# Patient Record
Sex: FEMALE | Race: WHITE | NOT HISPANIC OR LATINO
[De-identification: names, ages, dates, MRNs, and addresses within clinical notes are randomized per-mention and may not be internally consistent; named-entity substitution may affect disease eponyms.]

---

## 2017-10-09 ENCOUNTER — TRANSCRIPTION ENCOUNTER (OUTPATIENT)
Age: 57
End: 2017-10-09

## 2018-05-29 ENCOUNTER — TRANSCRIPTION ENCOUNTER (OUTPATIENT)
Age: 58
End: 2018-05-29

## 2019-01-12 ENCOUNTER — TRANSCRIPTION ENCOUNTER (OUTPATIENT)
Age: 59
End: 2019-01-12

## 2019-02-17 ENCOUNTER — TRANSCRIPTION ENCOUNTER (OUTPATIENT)
Age: 59
End: 2019-02-17

## 2020-08-06 ENCOUNTER — FORM ENCOUNTER (OUTPATIENT)
Age: 60
End: 2020-08-06

## 2021-07-09 ENCOUNTER — EMERGENCY (EMERGENCY)
Facility: HOSPITAL | Age: 61
LOS: 1 days | Discharge: HOME | End: 2021-07-09
Attending: EMERGENCY MEDICINE | Admitting: EMERGENCY MEDICINE
Payer: COMMERCIAL

## 2021-07-09 VITALS
WEIGHT: 160.06 LBS | HEART RATE: 85 BPM | DIASTOLIC BLOOD PRESSURE: 71 MMHG | TEMPERATURE: 98 F | HEIGHT: 64 IN | RESPIRATION RATE: 16 BRPM | OXYGEN SATURATION: 98 % | SYSTOLIC BLOOD PRESSURE: 148 MMHG

## 2021-07-09 VITALS
SYSTOLIC BLOOD PRESSURE: 135 MMHG | HEART RATE: 79 BPM | DIASTOLIC BLOOD PRESSURE: 74 MMHG | OXYGEN SATURATION: 98 % | TEMPERATURE: 98 F | RESPIRATION RATE: 16 BRPM

## 2021-07-09 PROCEDURE — 73590 X-RAY EXAM OF LOWER LEG: CPT

## 2021-07-09 PROCEDURE — 99284 EMERGENCY DEPT VISIT MOD MDM: CPT

## 2021-07-09 PROCEDURE — 99284 EMERGENCY DEPT VISIT MOD MDM: CPT | Mod: 25

## 2021-07-09 PROCEDURE — 73564 X-RAY EXAM KNEE 4 OR MORE: CPT

## 2021-07-09 PROCEDURE — 73564 X-RAY EXAM KNEE 4 OR MORE: CPT | Mod: 26,LT

## 2021-07-09 PROCEDURE — 73590 X-RAY EXAM OF LOWER LEG: CPT | Mod: 26,LT

## 2021-07-09 RX ORDER — SIMVASTATIN 20 MG/1
1 TABLET, FILM COATED ORAL
Qty: 0 | Refills: 0 | DISCHARGE

## 2021-07-09 RX ORDER — OXYCODONE AND ACETAMINOPHEN 5; 325 MG/1; MG/1
1 TABLET ORAL
Qty: 12 | Refills: 0
Start: 2021-07-09

## 2021-07-09 NOTE — ED PROVIDER NOTE - OBJECTIVE STATEMENT
59 y/o F w/ no pertinent PMHx presents to ED c/o knee pain s/p fall. Pt fell from 2 steps in basement and shoulder broke through wall and knee hit ground x2 hours ago. Pt endorses ankle pain and knee pain. Denies chest pain and abd pain. Pt is COVID vaccinated. Nonsmoker. NKDA

## 2021-07-09 NOTE — ED PROVIDER NOTE - CARE PLAN
Principal Discharge DX:	Knee injuries, left, initial encounter  Secondary Diagnosis:	Fall on stairs, initial encounter

## 2021-07-09 NOTE — ED ADULT NURSE NOTE - CHPI ED NUR TIMING2
Patient called in stating that an order for an MRI was supposed to be scheduled but there is no order in the chart. Please advise   sudden onset

## 2021-07-09 NOTE — ED PROVIDER NOTE - PHYSICAL EXAMINATION
L knee: mild edema, FROM with no laxity. Nl mild tend to lat mid lower leg. No tend to ankle / foot. Nl dist str/sens equal bl, 2+ pulses.

## 2021-07-09 NOTE — ED PROVIDER NOTE - PROGRESS NOTE DETAILS
Discussed with Patient and/or Family regarding the X-ray findings.  Discussed the risks of occult / secondary fracture or ligamentous/tendon injury. Discussed the importance of close prompt follow-up with Orthopaedics for definitive workup and treatment.  Also discussed injury / neuro precautions.  Verbalization of understanding of all instructions was shown and an opportunity was given for questions.   Pt placed in knee imob, immuno UTD

## 2021-07-09 NOTE — ED ADULT NURSE NOTE - OBJECTIVE STATEMENT
pt c/o L knee pain s/p slip and fall down @ step approx.  2 hrs ago. pt states she was wearing flip flops and tripped down the steps landing on her L elbow but now has L knee pain. Pt denies head injury, LOC or Blood thinners. pt MAEx4, neuro intact.

## 2021-07-09 NOTE — ED PROVIDER NOTE - PATIENT PORTAL LINK FT
You can access the FollowMyHealth Patient Portal offered by [facility] by registering at the following website: [email or web address]. By joining [facility]’s FollowMyHealth portal, you will also be able to view your health information using other applications (apps) compatible with our system.

## 2021-07-09 NOTE — ED PROVIDER NOTE - ENMT, MLM
Airway patent, Nasal mucosa clear. Mouth with normal mucosa. Throat has no vesicles, no oropharyngeal exudates and uvula is midline. No signs of head trauma.

## 2021-07-09 NOTE — ED PROVIDER NOTE - CARE PROVIDER_API CALL
FINA PANCHAL  Orthopedic Surgery  [address] [place] [address]  Phone: [phone]  Fax: [phone]  Follow Up Time:     WAGNER STAUFFER  [address] [address]  Phone: [phone]  Fax: [phone]  Follow Up Time:

## 2021-07-09 NOTE — ED ADULT NURSE NOTE - CHPI ED NUR SYMPTOMS NEG
no abrasion/no back pain/no bruising/no difficulty bearing weight/no fever/no numbness/no tingling/no weakness

## 2021-07-09 NOTE — ED PROVIDER NOTE - NSFOLLOWUPINSTRUCTIONS_ED_ALL_ED_FT
1) Follow-up with your Primary Medical Doctor. Call today / next business day for prompt follow-up.  2) Return to Emergency room for any worsening or persistent pain, shortness of breath, chest pains, abdominal pain, numbness, tingling, headaches, vomiting, visual changes, dizziness, weakness, fever, if you are having difficulty getting around or you feel unsteady, or if you have any other concerning symptoms.  3) See attached instruction sheets for additional information, including information regarding signs and symptoms to look out for, reasons to seek immediate care and other important instructions.  4) Make sure to take your time with walking, especially when first standing up.   5) Follow-up with orthopedics , call monday for prompt follow-up  6) percocet as needed , no driving

## 2021-09-05 ENCOUNTER — TRANSCRIPTION ENCOUNTER (OUTPATIENT)
Age: 61
End: 2021-09-05

## 2021-12-02 ENCOUNTER — TRANSCRIPTION ENCOUNTER (OUTPATIENT)
Age: 61
End: 2021-12-02

## 2022-05-03 ENCOUNTER — NON-APPOINTMENT (OUTPATIENT)
Age: 62
End: 2022-05-03

## 2022-05-03 PROBLEM — Z00.00 ENCOUNTER FOR PREVENTIVE HEALTH EXAMINATION: Status: ACTIVE | Noted: 2022-05-03

## 2022-05-17 ENCOUNTER — NON-APPOINTMENT (OUTPATIENT)
Age: 62
End: 2022-05-17

## 2022-05-31 ENCOUNTER — FORM ENCOUNTER (OUTPATIENT)
Age: 62
End: 2022-05-31

## 2022-10-20 ENCOUNTER — NON-APPOINTMENT (OUTPATIENT)
Age: 62
End: 2022-10-20

## 2022-11-03 PROBLEM — Z78.9 OTHER SPECIFIED HEALTH STATUS: Chronic | Status: ACTIVE | Noted: 2021-07-09

## 2023-01-09 ENCOUNTER — APPOINTMENT (OUTPATIENT)
Dept: OBGYN | Facility: CLINIC | Age: 63
End: 2023-01-09
Payer: COMMERCIAL

## 2023-01-09 VITALS
DIASTOLIC BLOOD PRESSURE: 82 MMHG | HEIGHT: 64 IN | HEART RATE: 82 BPM | OXYGEN SATURATION: 97 % | RESPIRATION RATE: 16 BRPM | WEIGHT: 164 LBS | SYSTOLIC BLOOD PRESSURE: 122 MMHG | BODY MASS INDEX: 28 KG/M2

## 2023-01-09 DIAGNOSIS — Z86.79 PERSONAL HISTORY OF OTHER DISEASES OF THE CIRCULATORY SYSTEM: ICD-10-CM

## 2023-01-09 PROCEDURE — 99386 PREV VISIT NEW AGE 40-64: CPT

## 2023-01-09 PROCEDURE — 82270 OCCULT BLOOD FECES: CPT

## 2023-01-09 NOTE — PHYSICAL EXAM
[Chaperone Present] : A chaperone was present in the examining room during all aspects of the physical examination [Appropriately responsive] : appropriately responsive [Alert] : alert [No Acute Distress] : no acute distress [No Lymphadenopathy] : no lymphadenopathy [Regular Rate Rhythm] : regular rate rhythm [No Murmurs] : no murmurs [Clear to Auscultation B/L] : clear to auscultation bilaterally [Soft] : soft [Non-tender] : non-tender [Non-distended] : non-distended [No HSM] : No HSM [No Lesions] : no lesions [No Mass] : no mass [Oriented x3] : oriented x3 [Examination Of The Breasts] : a normal appearance [No Masses] : no breast masses were palpable [Labia Majora] : normal [Labia Minora] : normal [Normal] : normal [Uterine Adnexae] : normal [FreeTextEntry1] : The documentation for this encounter was entered by Flory Fairchild acting as a scribe for Dr. Wahl.  [Occult Blood Positive] : was negative for occult blood analysis

## 2023-01-09 NOTE — PLAN
[FreeTextEntry1] : I have spent 40 minutes of time on this encounter.  Greater than 50% of the face-to-face encounter time was spent on counseling and/or coordination of care for examination findings, differential, testing, management and planning. 10 minutes were allotted to discussing the depression screening. Yearly breast cancer screening with no current clinical or radiographic concerns.  The patient was reminded regarding future well breast and general healthcare, breast cancer risk reduction, the importance of self-examination and the need for follow up.   She was again reminded of the need to take Vit D3 2500  IU daily or to keep a Vit D level above 30, and Tumeric 1000mg daily with Black Pepper.  Plan continued yearly imaging and breast follow up, sooner as needed.  I counseled the patient on current recommendations to reduce breast cancer risk including but not inclusive to regular exercise 20-30 minutes 3-4 times a week, low fat diet, limiting alcohol consumption, maintenance of ideal body weight, yearly imaging and self breast awareness.  Questions answered.  I encouraged in light of Covid 19, social distancing, frequent hand washing and precautions to stay healthy.\par  1)  Self breast exam instructions, calcium supplementation discussed with the patient.\par 2)  Mammography, lipid profile assessment, TSH screening, fasting glucose testing, colonoscopy screening were discussed with the patient.  Vitamin D supplementation\par 3)  Maintain healthy weight.\par 4)  Regular health maintenance with PCP.\par 5)  Remain tobacco free.\par 6)  Limit alcohol intake to less than 5 drinks per week.\par 7)  Osteoporosis screening.\par 8)  Annual cholesterol screening.\par 9)  Annual influenza vaccine.The importance of routine physical activity was reviewed and a goal of 150 minutes of moderately vigorous exercise per week was endorsed.

## 2023-01-09 NOTE — HISTORY OF PRESENT ILLNESS
[Y] : Positive pregnancy history [LMP unknown] : LMP unknown [Hot Flashes] : hot flashes [Night Sweats] : night sweats [unknown] : Patient is unsure of the date of her LMP [FreeTextEntry1] : pt is here for annual. She is doing well. \par The patient presents today for a routine GYN exam.  She offers no complaints.  We reviewed together in detail her past medical and surgical histories, allergies and medication usage, social and family history.   All questions were answered in easy to understand language.  [Mammogramdate] : 08/21 [BreastSonogramDate] : 08/21 [BoneDensityDate] : 03/22 [PGHxTotal] : 2

## 2023-01-11 LAB
HPV 16 E6+E7 MRNA CVX QL NAA+PROBE: NOT DETECTED
HPV18+45 E6+E7 MRNA CVX QL NAA+PROBE: NOT DETECTED

## 2023-01-14 LAB — CYTOLOGY CVX/VAG DOC THIN PREP: NORMAL

## 2023-12-25 ENCOUNTER — NON-APPOINTMENT (OUTPATIENT)
Age: 63
End: 2023-12-25

## 2024-01-04 NOTE — ED ADULT TRIAGE NOTE - WEIGHT IN KG
Cooperative Cooperative 72.6 Cooperative Cooperative Cooperative Cooperative Cooperative Cooperative Cooperative Cooperative Cooperative Cooperative Cooperative Cooperative Cooperative Cooperative Cooperative Cooperative Cooperative

## 2024-03-05 DIAGNOSIS — Z13.820 ENCOUNTER FOR SCREENING FOR OSTEOPOROSIS: ICD-10-CM

## 2024-03-05 DIAGNOSIS — Z11.3 ENCOUNTER FOR SCREENING FOR INFECTIONS WITH A PREDOMINANTLY SEXUAL MODE OF TRANSMISSION: ICD-10-CM

## 2024-03-05 DIAGNOSIS — Z12.39 ENCOUNTER FOR OTHER SCREENING FOR MALIGNANT NEOPLASM OF BREAST: ICD-10-CM

## 2024-03-06 ENCOUNTER — APPOINTMENT (OUTPATIENT)
Dept: OBGYN | Facility: CLINIC | Age: 64
End: 2024-03-06
Payer: COMMERCIAL

## 2024-03-06 VITALS
RESPIRATION RATE: 16 BRPM | WEIGHT: 163 LBS | BODY MASS INDEX: 27.83 KG/M2 | SYSTOLIC BLOOD PRESSURE: 120 MMHG | HEART RATE: 73 BPM | HEIGHT: 64 IN | OXYGEN SATURATION: 99 % | DIASTOLIC BLOOD PRESSURE: 70 MMHG

## 2024-03-06 DIAGNOSIS — Z12.4 ENCOUNTER FOR SCREENING FOR MALIGNANT NEOPLASM OF CERVIX: ICD-10-CM

## 2024-03-06 DIAGNOSIS — Z12.11 ENCOUNTER FOR SCREENING FOR MALIGNANT NEOPLASM OF COLON: ICD-10-CM

## 2024-03-06 DIAGNOSIS — Z12.39 ENCOUNTER FOR OTHER SCREENING FOR MALIGNANT NEOPLASM OF BREAST: ICD-10-CM

## 2024-03-06 DIAGNOSIS — Z01.419 ENCOUNTER FOR GYNECOLOGICAL EXAMINATION (GENERAL) (ROUTINE) W/OUT ABNORMAL FINDINGS: ICD-10-CM

## 2024-03-06 DIAGNOSIS — Z78.9 OTHER SPECIFIED HEALTH STATUS: ICD-10-CM

## 2024-03-06 DIAGNOSIS — Z13.31 ENCOUNTER FOR SCREENING FOR DEPRESSION: ICD-10-CM

## 2024-03-06 LAB
BILIRUB UR QL STRIP: NEGATIVE
CLARITY UR: CLEAR
COLLECTION METHOD: NORMAL
GLUCOSE UR-MCNC: NEGATIVE
HCG UR QL: 0.2 EU/DL
HGB UR QL STRIP.AUTO: NORMAL
KETONES UR-MCNC: NEGATIVE
LEUKOCYTE ESTERASE UR QL STRIP: NORMAL
NITRITE UR QL STRIP: NEGATIVE
PH UR STRIP: 5.5
PROT UR STRIP-MCNC: NEGATIVE
SP GR UR STRIP: 1.02

## 2024-03-06 PROCEDURE — 81003 URINALYSIS AUTO W/O SCOPE: CPT | Mod: QW

## 2024-03-06 PROCEDURE — 82270 OCCULT BLOOD FECES: CPT

## 2024-03-06 PROCEDURE — 99396 PREV VISIT EST AGE 40-64: CPT

## 2024-03-06 RX ORDER — ZOLPIDEM TARTRATE 10 MG/1
10 TABLET, FILM COATED ORAL
Refills: 0 | Status: ACTIVE | COMMUNITY

## 2024-03-06 RX ORDER — LOSARTAN POTASSIUM 50 MG/1
50 TABLET, FILM COATED ORAL
Refills: 0 | Status: ACTIVE | COMMUNITY

## 2024-03-06 RX ORDER — MELATONIN 3 MG
TABLET ORAL
Refills: 0 | Status: ACTIVE | COMMUNITY

## 2024-03-06 RX ORDER — SIMVASTATIN 40 MG/1
40 TABLET, FILM COATED ORAL
Refills: 0 | Status: ACTIVE | COMMUNITY

## 2024-03-06 NOTE — HISTORY OF PRESENT ILLNESS
[Y] : Positive pregnancy history [Night Sweats] : night sweats [Hot Flashes] : hot flashes [Insomnia] : insomnia [Men] : men [Currently Active] : currently active [No] : No [TextBox_4] : The patient presents today for a routine GYN exam. She offers no complaints. We reviewed together in detail her past medical and surgical histories, allergies and medication usage, social and family history. All questions were answered in easy to understand language. [Mammogramdate] : 02/2023 [BreastSonogramDate] : 02/2023 [PapSmeardate] : 01/2023 [BoneDensityDate] : 03/2022 [ColonoscopyDate] : 01/2022 [TextBox_78] :  no hv [[facility]xFullTerm] : 2 [PGHxTotal] : 2 [[facility] Center[facility]Living] : 2

## 2024-03-06 NOTE — PHYSICAL EXAM
[Chaperone Present] : A chaperone was present in the examining room during all aspects of the physical examination [Appropriately responsive] : appropriately responsive [Alert] : alert [No Lymphadenopathy] : no lymphadenopathy [No Acute Distress] : no acute distress [Non-tender] : non-tender [Soft] : soft [Non-distended] : non-distended [No HSM] : No HSM [No Lesions] : no lesions [Oriented x3] : oriented x3 [No Mass] : no mass [Examination Of The Breasts] : a normal appearance [Diffuse Fibrous Tissue In The Right Breast] : fibrocystic changes [No Masses] : no breast masses were palpable [Labia Majora] : normal [Labia Minora] : normal [Uterine Adnexae] : normal [Normal rectal exam] : was normal [Normal Brown Stool] : was normal and brown [Normal] : was normal [None] : there was no rectal mass  [FreeTextEntry1] : This note was written by Dora Gross on 03/06/2024, acting as a scribe for Dr. Clemente Wahl MD. All medic record entries were at my, Dr. Clemente Wahl MD, direction and personally dictated by me in 03/06/2024. I have personally reviewed the chart and agree that the record accurately reflects my personal performance of the history, physical exam, assessment, and plan.  [FreeTextEntry6] : mobile cyst 9 o'clock R breast [Occult Blood Positive] : was negative for occult blood analysis [Internal Hemorrhoid] : no internal hemorrhoids were present [External Hemorrhoid] : no external hemorrhoids were present [Skin Tags] : no residual hemorrhoidal skin tags

## 2024-03-07 LAB
HPV 16 E6+E7 MRNA CVX QL NAA+PROBE: NOT DETECTED
HPV18+45 E6+E7 MRNA CVX QL NAA+PROBE: NOT DETECTED
SOURCE AMPLIFICATION: NORMAL
T VAGINALIS RRNA SPEC QL NAA+PROBE: NOT DETECTED

## 2024-03-11 LAB — CYTOLOGY CVX/VAG DOC THIN PREP: NORMAL

## 2024-05-04 ENCOUNTER — NON-APPOINTMENT (OUTPATIENT)
Age: 64
End: 2024-05-04

## 2024-05-31 NOTE — ED ADULT NURSE NOTE - HOW OFTEN DO YOU HAVE A DRINK CONTAINING ALCOHOL?
Lipid abnormalities are improving with treatment    Plan:  Continue same medication/s without change.      Discussed medication dosage, use, side effects, and goals of treatment in detail.    Counseled patient on lifestyle modifications to help control hyperlipidemia.     Patient Treatment Goals:   LDL goal is under 100    Followup at the next regular appointment.   Never

## 2024-09-26 ENCOUNTER — NON-APPOINTMENT (OUTPATIENT)
Age: 64
End: 2024-09-26

## 2024-12-26 ENCOUNTER — NON-APPOINTMENT (OUTPATIENT)
Age: 64
End: 2024-12-26

## 2025-02-02 ENCOUNTER — NON-APPOINTMENT (OUTPATIENT)
Age: 65
End: 2025-02-02

## 2025-03-14 DIAGNOSIS — Z13.820 ENCOUNTER FOR SCREENING FOR OSTEOPOROSIS: ICD-10-CM

## 2025-03-14 DIAGNOSIS — Z13.31 ENCOUNTER FOR SCREENING FOR DEPRESSION: ICD-10-CM

## 2025-03-14 DIAGNOSIS — Z12.39 ENCOUNTER FOR OTHER SCREENING FOR MALIGNANT NEOPLASM OF BREAST: ICD-10-CM

## 2025-03-19 ENCOUNTER — APPOINTMENT (OUTPATIENT)
Dept: OBGYN | Facility: CLINIC | Age: 65
End: 2025-03-19
Payer: COMMERCIAL

## 2025-03-19 VITALS
HEIGHT: 64 IN | SYSTOLIC BLOOD PRESSURE: 116 MMHG | WEIGHT: 158 LBS | DIASTOLIC BLOOD PRESSURE: 72 MMHG | OXYGEN SATURATION: 98 % | BODY MASS INDEX: 26.98 KG/M2 | HEART RATE: 85 BPM | RESPIRATION RATE: 14 BRPM

## 2025-03-19 DIAGNOSIS — Z80.1 FAMILY HISTORY OF MALIGNANT NEOPLASM OF TRACHEA, BRONCHUS AND LUNG: ICD-10-CM

## 2025-03-19 DIAGNOSIS — Z01.419 ENCOUNTER FOR GYNECOLOGICAL EXAMINATION (GENERAL) (ROUTINE) W/OUT ABNORMAL FINDINGS: ICD-10-CM

## 2025-03-19 DIAGNOSIS — Z12.39 ENCOUNTER FOR OTHER SCREENING FOR MALIGNANT NEOPLASM OF BREAST: ICD-10-CM

## 2025-03-19 DIAGNOSIS — Z82.49 FAMILY HISTORY OF ISCHEMIC HEART DISEASE AND OTHER DISEASES OF THE CIRCULATORY SYSTEM: ICD-10-CM

## 2025-03-19 DIAGNOSIS — Z12.11 ENCOUNTER FOR SCREENING FOR MALIGNANT NEOPLASM OF COLON: ICD-10-CM

## 2025-03-19 DIAGNOSIS — Z12.4 ENCOUNTER FOR SCREENING FOR MALIGNANT NEOPLASM OF CERVIX: ICD-10-CM

## 2025-03-19 PROCEDURE — 99459 PELVIC EXAMINATION: CPT

## 2025-03-19 PROCEDURE — 99396 PREV VISIT EST AGE 40-64: CPT

## 2025-03-19 PROCEDURE — 82270 OCCULT BLOOD FECES: CPT

## 2025-03-21 LAB — HPV HIGH+LOW RISK DNA PNL CVX: NOT DETECTED

## 2025-03-23 LAB — CYTOLOGY CVX/VAG DOC THIN PREP: NORMAL

## 2025-05-30 ENCOUNTER — NON-APPOINTMENT (OUTPATIENT)
Age: 65
End: 2025-05-30

## 2025-05-31 ENCOUNTER — INPATIENT (INPATIENT)
Facility: HOSPITAL | Age: 65
LOS: 0 days | Discharge: HOME | DRG: 603 | End: 2025-06-01
Attending: FAMILY MEDICINE | Admitting: FAMILY MEDICINE
Payer: COMMERCIAL

## 2025-05-31 ENCOUNTER — TRANSCRIPTION ENCOUNTER (OUTPATIENT)
Age: 65
End: 2025-05-31

## 2025-05-31 VITALS
SYSTOLIC BLOOD PRESSURE: 150 MMHG | HEIGHT: 64 IN | TEMPERATURE: 98 F | DIASTOLIC BLOOD PRESSURE: 75 MMHG | HEART RATE: 79 BPM | OXYGEN SATURATION: 99 % | RESPIRATION RATE: 16 BRPM | WEIGHT: 149.91 LBS

## 2025-05-31 DIAGNOSIS — L03.90 CELLULITIS, UNSPECIFIED: ICD-10-CM

## 2025-05-31 DIAGNOSIS — Z29.9 ENCOUNTER FOR PROPHYLACTIC MEASURES, UNSPECIFIED: ICD-10-CM

## 2025-05-31 DIAGNOSIS — E78.00 PURE HYPERCHOLESTEROLEMIA, UNSPECIFIED: ICD-10-CM

## 2025-05-31 LAB
ALBUMIN SERPL ELPH-MCNC: 3.5 G/DL — SIGNIFICANT CHANGE UP (ref 3.3–5)
ALP SERPL-CCNC: 84 U/L — SIGNIFICANT CHANGE UP (ref 30–120)
ALT FLD-CCNC: 23 U/L — SIGNIFICANT CHANGE UP (ref 10–60)
ANION GAP SERPL CALC-SCNC: 8 MMOL/L — SIGNIFICANT CHANGE UP (ref 5–17)
APTT BLD: 29.8 SEC — SIGNIFICANT CHANGE UP (ref 26.1–36.8)
AST SERPL-CCNC: 15 U/L — SIGNIFICANT CHANGE UP (ref 10–40)
BASOPHILS # BLD AUTO: 0.05 K/UL — SIGNIFICANT CHANGE UP (ref 0–0.2)
BASOPHILS NFR BLD AUTO: 0.8 % — SIGNIFICANT CHANGE UP (ref 0–2)
BILIRUB SERPL-MCNC: 0.3 MG/DL — SIGNIFICANT CHANGE UP (ref 0.2–1.2)
BUN SERPL-MCNC: 15 MG/DL — SIGNIFICANT CHANGE UP (ref 7–23)
CALCIUM SERPL-MCNC: 9.4 MG/DL — SIGNIFICANT CHANGE UP (ref 8.4–10.5)
CHLORIDE SERPL-SCNC: 105 MMOL/L — SIGNIFICANT CHANGE UP (ref 96–108)
CO2 SERPL-SCNC: 31 MMOL/L — SIGNIFICANT CHANGE UP (ref 22–31)
CREAT SERPL-MCNC: 0.83 MG/DL — SIGNIFICANT CHANGE UP (ref 0.5–1.3)
EGFR: 79 ML/MIN/1.73M2 — SIGNIFICANT CHANGE UP
EGFR: 79 ML/MIN/1.73M2 — SIGNIFICANT CHANGE UP
EOSINOPHIL # BLD AUTO: 0.24 K/UL — SIGNIFICANT CHANGE UP (ref 0–0.5)
EOSINOPHIL NFR BLD AUTO: 3.9 % — SIGNIFICANT CHANGE UP (ref 0–6)
ERYTHROCYTE [SEDIMENTATION RATE] IN BLOOD: 38 MM/HR — HIGH (ref 0–20)
GLUCOSE SERPL-MCNC: 122 MG/DL — HIGH (ref 70–99)
HCT VFR BLD CALC: 40.5 % — SIGNIFICANT CHANGE UP (ref 34.5–45)
HGB BLD-MCNC: 13.4 G/DL — SIGNIFICANT CHANGE UP (ref 11.5–15.5)
IMM GRANULOCYTES NFR BLD AUTO: 0.3 % — SIGNIFICANT CHANGE UP (ref 0–0.9)
INR BLD: 1.02 RATIO — SIGNIFICANT CHANGE UP (ref 0.85–1.16)
LACTATE SERPL-SCNC: 1.8 MMOL/L — SIGNIFICANT CHANGE UP (ref 0.7–2)
LYMPHOCYTES # BLD AUTO: 1.75 K/UL — SIGNIFICANT CHANGE UP (ref 1–3.3)
LYMPHOCYTES # BLD AUTO: 28.1 % — SIGNIFICANT CHANGE UP (ref 13–44)
MCHC RBC-ENTMCNC: 31.1 PG — SIGNIFICANT CHANGE UP (ref 27–34)
MCHC RBC-ENTMCNC: 33.1 G/DL — SIGNIFICANT CHANGE UP (ref 32–36)
MCV RBC AUTO: 94 FL — SIGNIFICANT CHANGE UP (ref 80–100)
MONOCYTES # BLD AUTO: 0.48 K/UL — SIGNIFICANT CHANGE UP (ref 0–0.9)
MONOCYTES NFR BLD AUTO: 7.7 % — SIGNIFICANT CHANGE UP (ref 2–14)
NEUTROPHILS # BLD AUTO: 3.69 K/UL — SIGNIFICANT CHANGE UP (ref 1.8–7.4)
NEUTROPHILS NFR BLD AUTO: 59.2 % — SIGNIFICANT CHANGE UP (ref 43–77)
NRBC BLD AUTO-RTO: 0 /100 WBCS — SIGNIFICANT CHANGE UP (ref 0–0)
PLATELET # BLD AUTO: 283 K/UL — SIGNIFICANT CHANGE UP (ref 150–400)
POTASSIUM SERPL-MCNC: 3.8 MMOL/L — SIGNIFICANT CHANGE UP (ref 3.5–5.3)
POTASSIUM SERPL-SCNC: 3.8 MMOL/L — SIGNIFICANT CHANGE UP (ref 3.5–5.3)
PROT SERPL-MCNC: 7.3 G/DL — SIGNIFICANT CHANGE UP (ref 6–8.3)
PROTHROM AB SERPL-ACNC: 12 SEC — SIGNIFICANT CHANGE UP (ref 9.9–13.4)
RBC # BLD: 4.31 M/UL — SIGNIFICANT CHANGE UP (ref 3.8–5.2)
RBC # FLD: 11.5 % — SIGNIFICANT CHANGE UP (ref 10.3–14.5)
SODIUM SERPL-SCNC: 144 MMOL/L — SIGNIFICANT CHANGE UP (ref 135–145)
WBC # BLD: 6.23 K/UL — SIGNIFICANT CHANGE UP (ref 3.8–10.5)
WBC # FLD AUTO: 6.23 K/UL — SIGNIFICANT CHANGE UP (ref 3.8–10.5)

## 2025-05-31 PROCEDURE — 99285 EMERGENCY DEPT VISIT HI MDM: CPT

## 2025-05-31 PROCEDURE — 93010 ELECTROCARDIOGRAM REPORT: CPT

## 2025-05-31 PROCEDURE — 93971 EXTREMITY STUDY: CPT | Mod: 26,LT

## 2025-05-31 PROCEDURE — 99222 1ST HOSP IP/OBS MODERATE 55: CPT

## 2025-05-31 RX ORDER — CEFAZOLIN SODIUM IN 0.9 % NACL 3 G/100 ML
2000 INTRAVENOUS SOLUTION, PIGGYBACK (ML) INTRAVENOUS EVERY 8 HOURS
Refills: 0 | Status: COMPLETED | OUTPATIENT
Start: 2025-05-31 | End: 2025-05-31

## 2025-05-31 RX ORDER — LOSARTAN POTASSIUM 100 MG/1
1 TABLET, FILM COATED ORAL
Refills: 0 | DISCHARGE

## 2025-05-31 RX ORDER — HEPARIN SODIUM 1000 [USP'U]/ML
5000 INJECTION INTRAVENOUS; SUBCUTANEOUS EVERY 12 HOURS
Refills: 0 | Status: DISCONTINUED | OUTPATIENT
Start: 2025-05-31 | End: 2025-06-01

## 2025-05-31 RX ORDER — LACTOBACILLUS ACIDOPHILUS/PECT 75 MM-100
1 CAPSULE ORAL DAILY
Refills: 0 | Status: DISCONTINUED | OUTPATIENT
Start: 2025-05-31 | End: 2025-06-01

## 2025-05-31 RX ORDER — CEFAZOLIN SODIUM IN 0.9 % NACL 3 G/100 ML
INTRAVENOUS SOLUTION, PIGGYBACK (ML) INTRAVENOUS
Refills: 0 | Status: COMPLETED | OUTPATIENT
Start: 2025-05-31 | End: 2025-05-31

## 2025-05-31 RX ORDER — LOSARTAN POTASSIUM 100 MG/1
50 TABLET, FILM COATED ORAL DAILY
Refills: 0 | Status: DISCONTINUED | OUTPATIENT
Start: 2025-05-31 | End: 2025-06-01

## 2025-05-31 RX ORDER — VANCOMYCIN HCL IN 5 % DEXTROSE 1.5G/250ML
1000 PLASTIC BAG, INJECTION (ML) INTRAVENOUS ONCE
Refills: 0 | Status: COMPLETED | OUTPATIENT
Start: 2025-05-31 | End: 2025-05-31

## 2025-05-31 RX ORDER — ATORVASTATIN CALCIUM 80 MG/1
10 TABLET, FILM COATED ORAL AT BEDTIME
Refills: 0 | Status: DISCONTINUED | OUTPATIENT
Start: 2025-05-31 | End: 2025-06-01

## 2025-05-31 RX ORDER — CEFAZOLIN SODIUM IN 0.9 % NACL 3 G/100 ML
2000 INTRAVENOUS SOLUTION, PIGGYBACK (ML) INTRAVENOUS ONCE
Refills: 0 | Status: COMPLETED | OUTPATIENT
Start: 2025-05-31 | End: 2025-05-31

## 2025-05-31 RX ORDER — LACTOBACILLUS ACIDOPHILUS/PECT 75 MM-100
1 CAPSULE ORAL
Qty: 0 | Refills: 0 | DISCHARGE
Start: 2025-05-31

## 2025-05-31 RX ADMIN — Medication 2100 MILLILITER(S): at 10:54

## 2025-05-31 RX ADMIN — ATORVASTATIN CALCIUM 10 MILLIGRAM(S): 80 TABLET, FILM COATED ORAL at 21:18

## 2025-05-31 RX ADMIN — Medication 100 MILLIGRAM(S): at 21:17

## 2025-05-31 RX ADMIN — Medication 2000 MILLIGRAM(S): at 11:24

## 2025-05-31 RX ADMIN — Medication 1000 MILLIGRAM(S): at 12:30

## 2025-05-31 RX ADMIN — Medication 250 MILLIGRAM(S): at 11:24

## 2025-05-31 RX ADMIN — Medication 100 MILLIGRAM(S): at 10:54

## 2025-05-31 NOTE — CARE COORDINATION ASSESSMENT. - NSCAREPROVIDERS_GEN_ALL_CORE_FT
CARE PROVIDERS:  Accepting Physician: Jama Askew  Access Services: Doris Quinn  Administration: Richard Campbell  Admitting: Jama Askew  Attending: Jama Askew  Consultant: Marycruz Gray ED Attending: Davis Mcknight ED Nurse: Marlena Bray  Nurse: Chadd Calhoun  Nurse: Jossue Keith  Outpatient Provider: Amico, Frank  Override: Jossue Keith  Registered Dietitian: Milli Palmer  : Daylin Bingham  Team: [facility], Team  UR// Supp. Assoc.: Carolyn Moore

## 2025-05-31 NOTE — DISCHARGE NOTE PROVIDER - NSDCMRMEDTOKEN_GEN_ALL_CORE_FT
lactobacillus acidophilus oral capsule: 1 cap(s) orally once a day  losartan 50 mg oral tablet: 1 tab(s) orally once a day 40mg  simvastatin: 1 tab(s) orally once a day  vit d3: once a day  zolpidem 10 mg oral tablet: 1 tab(s) orally prn   doxycycline hyclate 100 mg oral tablet: 1 tab(s) orally 2 times a day  lactobacillus acidophilus oral capsule: 1 cap(s) orally once a day  losartan 50 mg oral tablet: 1 tab(s) orally once a day 40mg  simvastatin: 1 tab(s) orally once a day  vit d3: once a day  zolpidem 10 mg oral tablet: 1 tab(s) orally prn

## 2025-05-31 NOTE — CARE COORDINATION ASSESSMENT. - NSADDITIONAL INFORMATION_FT
Mrs. Annabelle Zelaya is a 65y/o , domiciled white female, w/ PMHx as indicated in H&P, who presents to [facility] 2/2 cellulitis.  met w/ patient @ bedside on [facility] for completion of care coordination assessment, @ which time patient appeared alert & oriented x4, as well as calm, pleasant, and fully able to engage in discussion.    Patient resides in a private home w/ her  and is typically able to attend to her activities of daily living (ADLs) independently w/o any durable medical equipment or home health aide services. She has access to her appointments & prescription medications in the community via driving herself or being driven by her  as needed. Her primary care physician is Dr. Tyler Baez MD, of [facility] [place], located @ [address] [place] [address], [address] Floor, [place] [address], phone [phone]. Her pharmacy in the community is [facility] @ [address] [place] [address], [place] [address], phone [phone]. No anticipated skilled needs on discharge.

## 2025-05-31 NOTE — DISCHARGE NOTE PROVIDER - HOSPITAL COURSE
TBD   Patient complaining of erythema swelling warmth and tenderness to left upper arm.  Patient relates she woke up with mild pain erythema swelling to her left upper arm on May 28 thought she may have been bitten by something but does not recall any actual stings or bites.  Patient relates symptoms worsened so she went to urgent care yesterday was started on doxycycline and borders were drawn around the area.  Patient was instructed to come to the ER if the erythema spread past the borders and since it did she came to the ER today.  Patient denies fevers chills weakness numbness or any other complaints.  In ER patient was found to have LUE cellulitis.  patient is being admitted for further work up and treatment         Problem/Plan - 1:  ·  Problem: Cellulitis.   ·  Plan: IV ABX - ancef -- clinically improved  ID eval  D/C home once cleared by ID.     Problem/Plan - 2:  ·  Problem: Preventive measure.   ·  Plan: Heparin for DVT prevention.     Problem/Plan - 3:  ·  Problem: Hypercholesteremia.   ·  Plan: continue statin.      >35 minutes spent on discharge

## 2025-05-31 NOTE — ED PROVIDER NOTE - PRINCIPAL DIAGNOSIS
Cellulitis Double O-Z Flap Text: The defect edges were debeveled with a #15 scalpel blade.  Given the location of the defect, shape of the defect and the proximity to free margins a Double O-Z flap was deemed most appropriate.  Using a sterile surgical marker, an appropriate transposition flap was drawn incorporating the defect and placing the expected incisions within the relaxed skin tension lines where possible. The area thus outlined was incised deep to adipose tissue with a #15 scalpel blade.  The skin margins were undermined to an appropriate distance in all directions utilizing iris scissors.

## 2025-05-31 NOTE — CONSULT NOTE ADULT - SUBJECTIVE AND OBJECTIVE BOX
[facility]  INFECTIOUS DISEASES   [address] [place] [address]  Tel: [phone]     Fax: [phone]  ========================================================  MD Epifanio Davies Michelle, MD Shah, Kaushal, MD Sunjit, Jaspal, MD Sehrish Shahid, MD   ========================================================    MRN-[id number]  BEN JEFFERSON     CC: Patient is a 64y old  Female who presents with a chief complaint of left arm pain   HPI:  65yo woman with PMH of high cholestrol presented to ED with erythema, swelling, warmth and tenderness to left upper arm.    Patient woke up with mild pain erythema and swelling to her left upper arm on May 28 thought she may have been bitten by something but does not recall any actual stings or bites. Patient relates symptoms worsened so she went to urgent care the same day  and was started on doxycycline and borders were drawn around the area.  Patient was instructed to come to the ER if the erythema spread past the borders and since it did she came to the ER today.  Patient denies fevers chills weakness numbness or any other complaints.    PAST MEDICAL & SURGICAL HISTORY:  High cholesterol  No significant past surgical history    Social Hx: No current smoking, EtOH or drugs     FAMILY HISTORY:  Noncontributory     Allergies  No Known Allergies    MEDICATIONS  (STANDING):  atorvastatin 10 milliGRAM(s) Oral at bedtime  ceFAZolin   IVPB 2000 milliGRAM(s) IV Intermittent every 8 hours  ceFAZolin   IVPB      heparin   Injectable 5000 Unit(s) SubCutaneous every 12 hours  lactobacillus acidophilus 1 Tablet(s) Oral daily  losartan 50 milliGRAM(s) Oral daily     REVIEW OF SYSTEMS:  CONSTITUTIONAL:  No Fever or chills  HEENT:  No diplopia or blurred vision.  No sore throat or runny nose.  CARDIOVASCULAR:  No chest pain   RESPIRATORY:  No cough, shortness of breath, PND or orthopnea.  GASTROINTESTINAL:  No nausea, vomiting or diarrhea.  GENITOURINARY:  No dysuria, frequency or urgency. No Blood in urine  MUSCULOSKELETAL:  no joint aches, no muscle pain  SKIN: Left upper arm swelling, pain     Physical Exam:  Vital Signs Last 24 Hrs  T(C): 36.7 (31 May 2025 10:21), Max: 36.7 (31 May 2025 10:21)  T(F): 98 (31 May 2025 10:21), Max: 98 (31 May 2025 10:21)  HR: 79 (31 May 2025 10:21) (79 - 79)  BP: 150/75 (31 May 2025 10:21) (150/75 - 150/75)  BP(mean): --  RR: 16 (31 May 2025 10:21) (16 - 16)  SpO2: 99% (31 May 2025 10:21) (99% - 99%)  Parameters below as of 31 May 2025 10:21  Patient On (Oxygen Delivery Method): room air  Height (cm): 162.6 (05-31 @ 10:21)  Weight (kg): 68 (05-31 @ 10:21)  BMI (kg/m2): 25.7 (05-31 @ 10:21)  BSA (m2): 1.73 (05-31 @ 10:21)  GEN: NAD  HEENT: normocephalic and atraumatic. EOMI. PERRL.    NECK: Supple.  No lymphadenopathy   LUNGS: Clear to auscultation.  HEART: Regular rate and rhythm   ABDOMEN: Soft, nontender, and nondistended.    EXTREMITIES: Without edema.  NEUROLOGIC: grossly intact.  PSYCHIATRIC: Appropriate affect .  SKIN: Left upper arm swelling, tenderness, warmth and erythema     Labs:  05-31    144  |  105  |  15  ----------------------------<  122[H]  3.8   |  31  |  0.83    Ca    9.4      31 May 2025 10:50    TPro  7.3  /  Alb  3.5  /  TBili  0.3  /  DBili  x   /  AST  15  /  ALT  23  /  AlkPhos  84  05-31                        13.4   6.23  )-----------( 283      ( 31 May 2025 10:50 )             40.5     PT/INR - ( 31 May 2025 10:50 )   PT: 12.0 sec;   INR: 1.02 ratio    PTT - ( 31 May 2025 10:50 )  PTT:29.8 sec  Urinalysis Basic - ( 31 May 2025 10:50 )    Color: x / Appearance: x / SG: x / pH: x  Gluc: 122 mg/dL / Ketone: x  / Bili: x / Urobili: x   Blood: x / Protein: x / Nitrite: x   Leuk Esterase: x / RBC: x / WBC x   Sq Epi: x / Non Sq Epi: x / Bacteria: x    LIVER FUNCTIONS - ( 31 May 2025 10:50 )  Alb: 3.5 g/dL / Pro: 7.3 g/dL / ALK PHOS: 84 U/L / ALT: 23 U/L / AST: 15 U/L / GGT: x             All imaging and other data have been reviewed.      Assessment and Plan:   65yo woman with PMH of high cholestrol presented to ED with erythema, swelling, warmth and tenderness to left upper arm.    No fever or leukocytosis in ED. Has been started on Cefazolin and ID was called for further recommendations.         Thank you for courtesy of this consult.     Will follow.  Discussed with the primary team.     Marycruz Gray MD  Division of Infectious Diseases   Please call ID service at [phone] with any question.    75 minutes spent on total encounter assessing patient, examination, chart review, counseling and coordinating care by the attending physician/nurse/care manager.   [facility]  INFECTIOUS DISEASES   [address] [place] [address]  Tel: [phone]     Fax: [phone]  ========================================================  MD Epifanio Davies Michelle, MD Shah, Kaushal, MD Sunjit, Jaspal, MD Sehrish Shahid, MD   ========================================================    MRN-[id number]  BEN JEFFERSON     CC: Patient is a 64y old  Female who presents with a chief complaint of left arm pain   HPI:  63yo woman with PMH of high cholestrol presented to ED with erythema, swelling, warmth and tenderness to left upper arm.    Patient woke up with mild pain erythema and swelling to her left upper arm on May 28 thought she may have been bitten by something but does not recall any actual stings or bites. Patient relates symptoms worsened so she went to urgent care the same day  and was started on doxycycline and borders were drawn around the area.  Patient was instructed to come to the ER if the erythema spread past the borders and since it did she came to the ER today.  Patient denies fevers chills weakness numbness or any other complaints.    PAST MEDICAL & SURGICAL HISTORY:  High cholesterol  No significant past surgical history    Social Hx: No current smoking, EtOH or drugs     FAMILY HISTORY:  Noncontributory     Allergies  No Known Allergies    MEDICATIONS  (STANDING):  atorvastatin 10 milliGRAM(s) Oral at bedtime  ceFAZolin   IVPB 2000 milliGRAM(s) IV Intermittent every 8 hours  ceFAZolin   IVPB      heparin   Injectable 5000 Unit(s) SubCutaneous every 12 hours  lactobacillus acidophilus 1 Tablet(s) Oral daily  losartan 50 milliGRAM(s) Oral daily     REVIEW OF SYSTEMS:  CONSTITUTIONAL:  No Fever or chills  HEENT:  No diplopia or blurred vision.  No sore throat or runny nose.  CARDIOVASCULAR:  No chest pain   RESPIRATORY:  No cough, shortness of breath, PND or orthopnea.  GASTROINTESTINAL:  No nausea, vomiting or diarrhea.  GENITOURINARY:  No dysuria, frequency or urgency. No Blood in urine  MUSCULOSKELETAL:  no joint aches, no muscle pain  SKIN: Left upper arm swelling, pain     Physical Exam:  Vital Signs Last 24 Hrs  T(C): 36.7 (31 May 2025 10:21), Max: 36.7 (31 May 2025 10:21)  T(F): 98 (31 May 2025 10:21), Max: 98 (31 May 2025 10:21)  HR: 79 (31 May 2025 10:21) (79 - 79)  BP: 150/75 (31 May 2025 10:21) (150/75 - 150/75)  BP(mean): --  RR: 16 (31 May 2025 10:21) (16 - 16)  SpO2: 99% (31 May 2025 10:21) (99% - 99%)  Parameters below as of 31 May 2025 10:21  Patient On (Oxygen Delivery Method): room air  Height (cm): 162.6 (05-31 @ 10:21)  Weight (kg): 68 (05-31 @ 10:21)  BMI (kg/m2): 25.7 (05-31 @ 10:21)  BSA (m2): 1.73 (05-31 @ 10:21)  GEN: NAD  HEENT: normocephalic and atraumatic. EOMI. PERRL.    NECK: Supple.  No lymphadenopathy   LUNGS: Clear to auscultation.  HEART: Regular rate and rhythm   ABDOMEN: Soft, nontender, and nondistended.    EXTREMITIES: Without edema.  NEUROLOGIC: grossly intact.  PSYCHIATRIC: Appropriate affect .  SKIN: Left lateral upper arm swelling, tenderness, warmth and erythema     Labs:  05-31    144  |  105  |  15  ----------------------------<  122[H]  3.8   |  31  |  0.83    Ca    9.4      31 May 2025 10:50    TPro  7.3  /  Alb  3.5  /  TBili  0.3  /  DBili  x   /  AST  15  /  ALT  23  /  AlkPhos  84  05-31                        13.4   6.23  )-----------( 283      ( 31 May 2025 10:50 )             40.5     PT/INR - ( 31 May 2025 10:50 )   PT: 12.0 sec;   INR: 1.02 ratio    PTT - ( 31 May 2025 10:50 )  PTT:29.8 sec  Urinalysis Basic - ( 31 May 2025 10:50 )    Color: x / Appearance: x / SG: x / pH: x  Gluc: 122 mg/dL / Ketone: x  / Bili: x / Urobili: x   Blood: x / Protein: x / Nitrite: x   Leuk Esterase: x / RBC: x / WBC x   Sq Epi: x / Non Sq Epi: x / Bacteria: x    LIVER FUNCTIONS - ( 31 May 2025 10:50 )  Alb: 3.5 g/dL / Pro: 7.3 g/dL / ALK PHOS: 84 U/L / ALT: 23 U/L / AST: 15 U/L / GGT: x             All imaging and other data have been reviewed.      Assessment and Plan:   63yo woman with PMH of high cholesterol presented to ED with erythema, swelling, warmth and tenderness to left upper arm.    No fever or leukocytosis in ED. Has been started on Cefazolin and ID was called for further recommendations.     # Left arm cellulitis could be insect bite?     - Will follow cultures   - Continue cefazolin   - Will some improvement can switch to oral    - Tick born disease serology at this time wouldn't be helpful for diagnosis but needs baseline lyme serology     Thank you for courtesy of this consult.     Will follow.  Discussed with the primary team.     Marycruz Gray MD  Division of Infectious Diseases   Please call ID service at [phone] with any question.    75 minutes spent on total encounter assessing patient, examination, chart review, counseling and coordinating care by the attending physician/nurse/care manager.

## 2025-05-31 NOTE — ED PROVIDER NOTE - CLINICAL SUMMARY MEDICAL DECISION MAKING FREE TEXT BOX
Patient complaining of erythema swelling warmth and tenderness to left upper arm.  Patient relates she woke up with mild pain erythema swelling to her left upper arm on May 28 thought she may have been bitten by something but does not recall any actual stings or bites.  Patient relates symptoms worsened so she went to urgent care yesterday was started on doxycycline and borders were drawn around the area.  Patient was instructed to come to the ER if the erythema spread past the borders and since it did she came to the ER today.  Patient denies fevers chills weakness numbness or any other complaints.  PMD arcati    Plan labs IV fluids antibiotics    Differential including but not limited to insect bite sepsis cellulitis no evidence of abscess on exam

## 2025-05-31 NOTE — H&P ADULT - HISTORY OF PRESENT ILLNESS
Patient complaining of erythema swelling warmth and tenderness to left upper arm.  Patient relates she woke up with mild pain erythema swelling to her left upper arm on May 28 thought she may have been bitten by something but does not recall any actual stings or bites.  Patient relates symptoms worsened so she went to urgent care yesterday was started on doxycycline and borders were drawn around the area.  Patient was instructed to come to the ER if the erythema spread past the borders and since it did she came to the ER today.  Patient denies fevers chills weakness numbness or any other complaints.  In ER patient was found to have LUE cellulitis.  patient is being admitted for further work up and treatment

## 2025-05-31 NOTE — DISCHARGE NOTE PROVIDER - CARE PROVIDERS DIRECT ADDRESSES
[email or web address]

## 2025-05-31 NOTE — ED PROVIDER NOTE - DIFFERENTIAL DIAGNOSIS
Differential Diagnosis Differential including but not limited to insect bite sepsis cellulitis no evidence of abscess on exam

## 2025-05-31 NOTE — ED ADULT TRIAGE NOTE - NS ED TRIAGE AVPU SCALE
Chest xray - no pneumonia    Azithromycin daily x 5 days (antibiotic)    Prednisone daily x 3 days - take with food (steroid)    Follow up if worsening or concerns  
Alert-The patient is alert, awake and responds to voice. The patient is oriented to time, place, and person. The triage nurse is able to obtain subjective information.

## 2025-05-31 NOTE — SBIRT NOTE ADULT - NSSBIRTALCPOSREINDET_GEN_A_CORE
Patient identified that she drinks 1/2 glass of wine nightly w/ dinner.  provided positive reinforcement regarding safe alcohol use habits.

## 2025-05-31 NOTE — ED ADULT NURSE NOTE - OBJECTIVE STATEMENT
Pt with Left arm redness, swelling, and warmth. Pt thought it was a bug bite, seen at urgent care and placed on abx and border around site and told to come to ED if it spreads which it has. Denies pain. Has some chills as well

## 2025-05-31 NOTE — ED PROVIDER NOTE - MUSCULOSKELETAL, MLM
Left upper am with swelling warmth tenderness erythema extending past borders drawn on arm c/w cellulitis

## 2025-05-31 NOTE — CAREGIVER ENGAGEMENT NOTE - CAREGIVER EDUCATION NOTES - FREE TEXT
Mrs. Annabelle Zelaya is a 65y/o , domiciled white female, w/ PMHx as indicated in H&P, who presents to [facility] 2/2 cellulitis.  met w/ patient @ bedside on [facility] for completion of care coordination assessment, @ which time patient appeared alert & oriented x4, as well as calm, pleasant, and fully able to engage in discussion.    Patient resides in a private home w/ her  and is typically able to attend to her activities of daily living (ADLs) independently w/o any durable medical equipment or home health aide services. She has access to her appointments & prescription medications in the community via driving herself or being driven by her  as needed. Her primary care physician is Dr. Tyler Baez MD, of [facility], located @ [address] [place] [address], [address] Floor, [place] [address], phone [phone]. Her pharmacy in the community is [facility] @ [address] [place] [address], [place] [address], phone [phone]. No anticipated skilled needs on discharge.

## 2025-05-31 NOTE — DISCHARGE NOTE PROVIDER - CARE PROVIDER_API CALL
WAGNER STAUFFER  [address] [place] [address]  Phone: [phone]  Fax: [phone]  Follow Up Time:     Marycruz Gray  Infectious Disease  [address] [place] [id number]  Phone: [phone]  Fax: [phone]  Follow Up Time:

## 2025-05-31 NOTE — PATIENT PROFILE ADULT - FALL HARM RISK - UNIVERSAL INTERVENTIONS
Bed in lowest position, wheels locked, appropriate side rails in place/Call bell, personal items and telephone in reach/Instruct patient to call for assistance before getting out of bed or chair/Non-slip footwear when patient is out of bed/[place] to call system/Physically safe environment - no spills, clutter or unnecessary equipment/Purposeful Proactive Rounding/Room/bathroom lighting operational, light cord in reach

## 2025-05-31 NOTE — H&P ADULT - NSHPLABSRESULTS_GEN_ALL_CORE
05-31    144  |  105  |  15  ----------------------------<  122[H]  3.8   |  31  |  0.83    Ca    9.4      31 May 2025 10:50    TPro  7.3  /  Alb  3.5  /  TBili  0.3  /  DBili  x   /  AST  15  /  ALT  23  /  AlkPhos  84  05-31                            13.4   6.23  )-----------( 283      ( 31 May 2025 10:50 )             40.5             LIVER FUNCTIONS - ( 31 May 2025 10:50 )  Alb: 3.5 g/dL / Pro: 7.3 g/dL / ALK PHOS: 84 U/L / ALT: 23 U/L / AST: 15 U/L / GGT: x             PT/INR - ( 31 May 2025 10:50 )   PT: 12.0 sec;   INR: 1.02 ratio         PTT - ( 31 May 2025 10:50 )  PTT:29.8 sec    Urinalysis Basic - ( 31 May 2025 10:50 )    Color: x / Appearance: x / SG: x / pH: x  Gluc: 122 mg/dL / Ketone: x  / Bili: x / Urobili: x   Blood: x / Protein: x / Nitrite: x   Leuk Esterase: x / RBC: x / WBC x   Sq Epi: x / Non Sq Epi: x / Bacteria: x

## 2025-05-31 NOTE — ED PROVIDER NOTE - OBJECTIVE STATEMENT
Patient complaining of erythema swelling warmth and tenderness to left upper arm.  Patient relates she woke up with mild pain erythema swelling to her left upper arm on May 28 thought she may have been bitten by something but does not recall any actual stings or bites.  Patient relates symptoms worsened so she went to urgent care yesterday was started on doxycycline and borders were drawn around the area.  Patient was instructed to come to the ER if the erythema spread past the borders and since it did she came to the ER today.  Patient denies fevers chills weakness numbness or any other complaints.  PMD arcati

## 2025-05-31 NOTE — CHART NOTE - NSCHARTNOTEFT_GEN_A_CORE

## 2025-05-31 NOTE — DISCHARGE NOTE PROVIDER - NSDCCPCAREPLAN_GEN_ALL_CORE_FT
PRINCIPAL DISCHARGE DIAGNOSIS  Diagnosis: Cellulitis  Assessment and Plan of Treatment: follow up with PMD Dr. dave     PRINCIPAL DISCHARGE DIAGNOSIS  Diagnosis: Cellulitis  Assessment and Plan of Treatment: follow up with PMD Dr. dave  Finish course of antibiotics.

## 2025-05-31 NOTE — H&P ADULT - NSHPREVIEWOFSYSTEMS_GEN_ALL_CORE
· Constitutional [+]: FEVER  · MUSCULOSKELETAL: - - -  · Musculoskeletal [+]: arm pain  · SKIN: - - -  · Skin [+]: RASH  · ROS STATEMENT: all other ROS negative except as per HPI

## 2025-05-31 NOTE — H&P ADULT - NSHPPHYSICALEXAM_GEN_ALL_CORE
· CONSTITUTIONAL: Well appearing, awake, alert, oriented to person, place, time/situation and in no apparent distress.  · ENMT: Airway patent, Nasal mucosa clear. Mouth with normal mucosa. Throat has no vesicles, no oropharyngeal exudates and uvula is midline.  · CARDIAC: - - -  · CARDIAC RHYTHM: regular  · CARDIAC RATE: normal  · CARDIAC SOUNDS: S1-S2  · CARDIAC CAPI REFILL: less than or equal to 2 seconds  · CARDIAC PULSES: normal bilaterally  · RESPIRATORY: Breath sounds clear and equal bilaterally.  · MUSCULOSKELETAL: Left upper am with swelling warmth tenderness erythema extending past borders drawn on arm c/w cellulitis  · SKIN: Skin normal color for race, warm, dry and intact. Left upper arm as above

## 2025-06-01 VITALS
OXYGEN SATURATION: 98 % | HEART RATE: 77 BPM | DIASTOLIC BLOOD PRESSURE: 77 MMHG | RESPIRATION RATE: 18 BRPM | SYSTOLIC BLOOD PRESSURE: 135 MMHG | TEMPERATURE: 98 F

## 2025-06-01 LAB
A1C WITH ESTIMATED AVERAGE GLUCOSE RESULT: 5.8 % — HIGH (ref 4–5.6)
CRP SERPL-MCNC: 31 MG/L — HIGH
ESTIMATED AVERAGE GLUCOSE: 120 MG/DL — HIGH (ref 68–114)
PROCALCITONIN SERPL-MCNC: 0.03 NG/ML — SIGNIFICANT CHANGE UP (ref 0.02–0.1)

## 2025-06-01 PROCEDURE — 36415 COLL VENOUS BLD VENIPUNCTURE: CPT

## 2025-06-01 PROCEDURE — 93005 ELECTROCARDIOGRAM TRACING: CPT

## 2025-06-01 PROCEDURE — 99285 EMERGENCY DEPT VISIT HI MDM: CPT | Mod: 25

## 2025-06-01 PROCEDURE — 86618 LYME DISEASE ANTIBODY: CPT

## 2025-06-01 PROCEDURE — 87040 BLOOD CULTURE FOR BACTERIA: CPT

## 2025-06-01 PROCEDURE — 85730 THROMBOPLASTIN TIME PARTIAL: CPT

## 2025-06-01 PROCEDURE — 86140 C-REACTIVE PROTEIN: CPT

## 2025-06-01 PROCEDURE — 80053 COMPREHEN METABOLIC PANEL: CPT

## 2025-06-01 PROCEDURE — 96365 THER/PROPH/DIAG IV INF INIT: CPT

## 2025-06-01 PROCEDURE — 99232 SBSQ HOSP IP/OBS MODERATE 35: CPT

## 2025-06-01 PROCEDURE — 83605 ASSAY OF LACTIC ACID: CPT

## 2025-06-01 PROCEDURE — 85610 PROTHROMBIN TIME: CPT

## 2025-06-01 PROCEDURE — 96367 TX/PROPH/DG ADDL SEQ IV INF: CPT

## 2025-06-01 PROCEDURE — 83036 HEMOGLOBIN GLYCOSYLATED A1C: CPT

## 2025-06-01 PROCEDURE — 85025 COMPLETE CBC W/AUTO DIFF WBC: CPT

## 2025-06-01 PROCEDURE — 85652 RBC SED RATE AUTOMATED: CPT

## 2025-06-01 PROCEDURE — 93971 EXTREMITY STUDY: CPT

## 2025-06-01 PROCEDURE — 84145 PROCALCITONIN (PCT): CPT

## 2025-06-01 RX ORDER — DOXYCYCLINE HYCLATE 100 MG
1 TABLET ORAL
Qty: 8 | Refills: 0
Start: 2025-06-01 | End: 2025-06-04

## 2025-06-01 RX ADMIN — LOSARTAN POTASSIUM 50 MILLIGRAM(S): 100 TABLET, FILM COATED ORAL at 05:49

## 2025-06-01 RX ADMIN — Medication 1 TABLET(S): at 11:06

## 2025-06-01 NOTE — CAREGIVER ENGAGEMENT NOTE - CAREGIVER OUTREACH NOTES - FREE TEXT
Per treatment team rounds and dr. Ko : Anticipate d/c today pending -   await ID clearance cellulitis Left  shoulder arm area.      CM met with pt. and   spouse who will transport home when cleared.  No needs for DME or home care per Dr. Ko.  CM provided d/c notice with anticipated d/c within 24 hours and pt signed  and given a copy.   Pt./spouse state they saw PCP 5/27/25 and will call themselves to see MD next week.  Office closed Sunday to make appt.   pt. looking forward to going home.  CM available if needed.    Patient's , Mr. Lakesha Zelaya  [address] [place] [address]  [phone]        Per treatment team rounds and dr. Ko : Anticipate d/c today pending -   await ID clearance cellulitis Left  shoulder arm area.      CM met with pt. and   spouse who will transport home when cleared.  No needs for DME or home care per Dr. Ko.  CM provided d/c notice with anticipated d/c within 24 hours and pt signed  and given a copy.   Pt./spouse state they saw PCP 5/27/25 and will call themselves to see MD next week.  Office closed Sunday to make appt.   pt. looking forward to going home.  CM available if needed.

## 2025-06-01 NOTE — DISCHARGE NOTE NURSING/CASE MANAGEMENT/SOCIAL WORK - PATIENT PORTAL LINK FT
You can access the FollowMyHealth Patient Portal offered by [facility] by registering at the following website: [email or web address]. By joining [employer]’s FollowMyHealth portal, you will also be able to view your health information using other applications (apps) compatible with our system.

## 2025-06-01 NOTE — DISCHARGE NOTE NURSING/CASE MANAGEMENT/SOCIAL WORK - NSDCPEFALRISK_GEN_ALL_CORE
For information on Fall & Injury Prevention, visit: [email or web address] OR  [email or web address] OR  https://www.cdc.gov/steadi/patient.html

## 2025-06-01 NOTE — DISCHARGE NOTE NURSING/CASE MANAGEMENT/SOCIAL WORK - FINANCIAL ASSISTANCE
[facility] provides services at a reduced cost to those who are determined to be eligible through [facility]’s financial assistance program. Information regarding [facility]’s financial assistance program can be found by going to [email or web address] or by calling [phone].

## 2025-06-01 NOTE — PROGRESS NOTE ADULT - SUBJECTIVE AND OBJECTIVE BOX
[facility]  INFECTIOUS DISEASES   [address] [place] [address]  Tel: [phone]     Fax: [phone]  ========================================================  MD Epifanio Davies Michelle, MD Shah, Kaushal, MD Sunjit, Jaspal, MD Sehrish Shahid, MD   ========================================================    MRN-[id number]  BEN JEFFERSON     CC: Patient is a 64y old  Female who presents with a chief complaint of left arm pain     Follow up: Left arm swelling and redness and pain all better. No fever. No other complaint.     PAST MEDICAL & SURGICAL HISTORY:  High cholesterol  No significant past surgical history    Social Hx: No current smoking, EtOH or drugs     FAMILY HISTORY:  Noncontributory     Allergies  No Known Allergies    MEDICATIONS  (STANDING):  atorvastatin 10 milliGRAM(s) Oral at bedtime  ceFAZolin   IVPB 2000 milliGRAM(s) IV Intermittent every 8 hours  ceFAZolin   IVPB      heparin   Injectable 5000 Unit(s) SubCutaneous every 12 hours  lactobacillus acidophilus 1 Tablet(s) Oral daily  losartan 50 milliGRAM(s) Oral daily     REVIEW OF SYSTEMS:  CONSTITUTIONAL:  No Fever or chills  HEENT:  No diplopia or blurred vision.  No sore throat or runny nose.  CARDIOVASCULAR:  No chest pain   RESPIRATORY:  No cough, shortness of breath, PND or orthopnea.  GASTROINTESTINAL:  No nausea, vomiting or diarrhea.  GENITOURINARY:  No dysuria, frequency or urgency. No Blood in urine  MUSCULOSKELETAL:  no joint aches, no muscle pain  SKIN: Left upper arm swelling, pain     Physical Exam:  Vital Signs Last 24 Hrs  T(C): 36.6 (01 Jun 2025 14:04), Max: 37.3 (31 May 2025 21:26)  T(F): 97.8 (01 Jun 2025 14:04), Max: 99.2 (31 May 2025 21:26)  HR: 77 (01 Jun 2025 14:04) (65 - 77)  BP: 135/77 (01 Jun 2025 14:04) (119/67 - 135/77)  BP(mean): --  RR: 18 (01 Jun 2025 14:04) (18 - 18)  SpO2: 98% (01 Jun 2025 14:04) (94% - 98%)  Parameters below as of 01 Jun 2025 14:04  Patient On (Oxygen Delivery Method): room air  GEN: NAD  HEENT: normocephalic and atraumatic. EOMI. PERRL.    NECK: Supple.  No lymphadenopathy   LUNGS: Clear to auscultation.  HEART: Regular rate and rhythm   ABDOMEN: Soft, nontender, and nondistended.    EXTREMITIES: Without edema.  NEUROLOGIC: grossly intact.  PSYCHIATRIC: Appropriate affect .  SKIN: Left lateral upper arm swelling, tenderness, warmth and erythema     Labs:                        13.4   6.23  )-----------( 283      ( 31 May 2025 10:50 )             40.5     05-31    144  |  105  |  15  ----------------------------<  122[H]  3.8   |  31  |  0.83    Ca    9.4      31 May 2025 10:50    TPro  7.3  /  Alb  3.5  /  TBili  0.3  /  DBili  x   /  AST  15  /  ALT  23  /  AlkPhos  84  05-31    WBC Count: 6.23 K/uL (05-31-25 @ 10:50)    Creatinine: 0.83 mg/dL (05-31-25 @ 10:50)    C-Reactive Protein: 31 mg/L (05-31-25 @ 13:24)    Sedimentation Rate, Erythrocyte: 38 mm/hr (05-31-25 @ 13:24)    Procalcitonin: 0.03 ng/mL (05-31-25 @ 13:24)     All imaging and other data have been reviewed.  < from: US Duplex Venous Upper Ext Ltd, Left (05.31.25 @ 22:40) >  IMPRESSION:  No evidence of left upper extremity deep venous thrombosis.    Assessment and Plan:   63yo woman with PMH of high cholesterol presented to ED with erythema, swelling, warmth and tenderness to left upper arm.    No fever or leukocytosis in ED. Has been started on Cefazolin and ID was called for further recommendations.     # Left arm cellulitis could be insect bite?     - Will follow cultures and Lyme serology(I will keep her in my list)  - On cefazolin   - Will switch to oral doxycycline 100mg q12 for one week     Will sign off please call with any question.  Discussed with the primary team.     Marycruz Gray MD  Division of Infectious Diseases   Please call ID service at [phone] with any question.    50 minutes spent on total encounter assessing patient, examination, chart review, counseling and coordinating care by the attending physician/nurse/care manager.  
Date of Service: 06-01-25 @ 09:26    Patient is a 64y old  Female who presents with a chief complaint of cellulitis (31 May 2025 20:32)      INTERVAL HPI/OVERNIGHT EVENTS: Patient seen and examined. NAD. No complaints.    Vital Signs Last 24 Hrs  T(C): 37 (01 Jun 2025 08:34), Max: 37.3 (31 May 2025 21:26)  T(F): 98.6 (01 Jun 2025 08:34), Max: 99.2 (31 May 2025 21:26)  HR: 69 (01 Jun 2025 08:34) (65 - 82)  BP: 157/57 (01 Jun 2025 08:34) (131/77 - 157/57)  BP(mean): --  RR: 18 (01 Jun 2025 08:34) (16 - 18)  SpO2: 94% (01 Jun 2025 08:34) (94% - 100%)    Parameters below as of 01 Jun 2025 04:50  Patient On (Oxygen Delivery Method): room air        05-31    144  |  105  |  15  ----------------------------<  122[H]  3.8   |  31  |  0.83    Ca    9.4      31 May 2025 10:50    TPro  7.3  /  Alb  3.5  /  TBili  0.3  /  DBili  x   /  AST  15  /  ALT  23  /  AlkPhos  84  05-31                          13.4   6.23  )-----------( 283      ( 31 May 2025 10:50 )             40.5     PT/INR - ( 31 May 2025 10:50 )   PT: 12.0 sec;   INR: 1.02 ratio         PTT - ( 31 May 2025 10:50 )  PTT:29.8 sec  CAPILLARY BLOOD GLUCOSE        Urinalysis Basic - ( 31 May 2025 10:50 )    Color: x / Appearance: x / SG: x / pH: x  Gluc: 122 mg/dL / Ketone: x  / Bili: x / Urobili: x   Blood: x / Protein: x / Nitrite: x   Leuk Esterase: x / RBC: x / WBC x   Sq Epi: x / Non Sq Epi: x / Bacteria: x              atorvastatin 10 milliGRAM(s) Oral at bedtime  heparin   Injectable 5000 Unit(s) SubCutaneous every 12 hours  lactobacillus acidophilus 1 Tablet(s) Oral daily  losartan 50 milliGRAM(s) Oral daily  zolpidem 5 milliGRAM(s) Oral at bedtime PRN              REVIEW OF SYSTEMS:  CONSTITUTIONAL: No fever, no weight loss, or no fatigue  NECK: No pain, no stiffness  RESPIRATORY: No cough, no wheezing, no chills, no hemoptysis, No shortness of breath  CARDIOVASCULAR: No chest pain, no palpitations, no dizziness, no leg swelling  GASTROINTESTINAL: No abdominal pain. No nausea, no vomiting, no hematemesis; No diarrhea, no constipation. No melena, no hematochezia.  GENITOURINARY: No dysuria, no frequency, no hematuria, no incontinence  NEUROLOGICAL: No headaches, no loss of strength, no numbness, no tremors  SKIN: No itching, no burning  MUSCULOSKELETAL: No joint pain, no swelling; No muscle, no back, no extremity pain  PSYCHIATRIC: No depression, no mood swings,   HEME/LYMPH: No easy bruising, no bleeding gums  ALLERY AND IMMUNOLOGIC: No hives       Consultant(s) Notes Reviewed:  [X] YES  [ ] NO    PHYSICAL EXAM:  GENERAL: NAD  HEAD:  Atraumatic, Normocephalic  EYES: EOMI, PERRLA, conjunctiva and sclera clear  ENMT: No tonsillar erythema, exudates, or enlargement; Moist mucous membranes  NECK: Supple, No JVD  NERVOUS SYSTEM:  Awake & alert  CHEST/LUNG: Clear to auscultation bilaterally; No rales, rhonchi, wheezing,  HEART: Regular rate and rhythm  ABDOMEN: Soft, Nontender, Nondistended; Bowel sounds present  EXTREMITIES:  No clubbing, cyanosis, or edema; minimal LUE erythema  LYMPH: No lymphadenopathy noted  SKIN: No rashes      Advanced care planning discussed with patient/family [X] YES   [ ] NO    Advanced care planning discussed with patient/family. Patient's health status was discussed. All appropriate changes have been made regarding patient's end-of-life care. Advanced care planning forms reviewed/discussed/completed.  20 minutes spent.

## 2025-06-01 NOTE — PROGRESS NOTE ADULT - TIME-BASED BILLING (NON-CRITICAL CARE)
pravastatin  Last visit: 1/17/22    Follow up visit: 7/18/2022    Last refill: 5/16/22  Labs:   CHOLESTEROL (mg/dL)   Date Value   10/25/2016 160     HDL (mg/dL)   Date Value   10/25/2016 47     CHOL/HDL (no units)   Date Value   10/25/2016 3.4     TRIGLYCERIDE (mg/dL)   Date Value   10/25/2016 147     CALCULATED LDL (mg/dL)   Date Value   10/25/2016 84       Refilled per protocol      Time-based billing (NON-critical care)

## 2025-06-02 ENCOUNTER — NON-APPOINTMENT (OUTPATIENT)
Age: 65
End: 2025-06-02

## 2025-06-02 LAB
B BURGDOR C6 AB SER-ACNC: NEGATIVE — SIGNIFICANT CHANGE UP
B BURGDOR IGG+IGM SER QL IB: SIGNIFICANT CHANGE UP
B BURGDOR IGG+IGM SER-ACNC: 0.07 INDEX — SIGNIFICANT CHANGE UP (ref 0.01–0.9)

## 2025-06-05 LAB
CULTURE RESULTS: SIGNIFICANT CHANGE UP
CULTURE RESULTS: SIGNIFICANT CHANGE UP
SPECIMEN SOURCE: SIGNIFICANT CHANGE UP
SPECIMEN SOURCE: SIGNIFICANT CHANGE UP